# Patient Record
Sex: FEMALE | NOT HISPANIC OR LATINO | URBAN - METROPOLITAN AREA
[De-identification: names, ages, dates, MRNs, and addresses within clinical notes are randomized per-mention and may not be internally consistent; named-entity substitution may affect disease eponyms.]

---

## 2017-03-31 ENCOUNTER — IMPORTED ENCOUNTER (OUTPATIENT)
Dept: URBAN - METROPOLITAN AREA CLINIC 31 | Facility: CLINIC | Age: 52
End: 2017-03-31

## 2017-03-31 PROBLEM — H04.123: Noted: 2017-03-31

## 2017-03-31 PROCEDURE — 92014 COMPRE OPH EXAM EST PT 1/>: CPT

## 2017-03-31 PROCEDURE — 92310 CONTACT LENS FITTING OU: CPT

## 2017-03-31 NOTE — PATIENT DISCUSSION
1.  Refractive error - Order tials. To be seen. See if either does better than present lenses. Understands limitations. 2. Dry Eye OU:  Continue current management with Artificial Tears. 3.  Return for an appointment in 1 year for comprehensive exam. with Dr. Cecil Champion.

## 2017-04-12 ENCOUNTER — IMPORTED ENCOUNTER (OUTPATIENT)
Dept: URBAN - METROPOLITAN AREA CLINIC 31 | Facility: CLINIC | Age: 52
End: 2017-04-12

## 2017-04-12 NOTE — PATIENT DISCUSSION
1.  Return for an appointment in 1 year for comprehensive exam. with Dr. Saima Barr. 2.  Good comfort with both Biofinity and Clariti 1 Day MFs but best vision with Claritis Dispense Claritis and compare to AV and decide which she prefers and then can order.

## 2022-04-02 ASSESSMENT — TONOMETRY
OD_IOP_MMHG: 14
OS_IOP_MMHG: 14

## 2022-04-02 ASSESSMENT — VISUAL ACUITY
OU_SC: 20/20-1
OD_SC: 20/25-1
OU_CC: 20/30